# Patient Record
Sex: FEMALE | Race: BLACK OR AFRICAN AMERICAN | Employment: OTHER | URBAN - METROPOLITAN AREA
[De-identification: names, ages, dates, MRNs, and addresses within clinical notes are randomized per-mention and may not be internally consistent; named-entity substitution may affect disease eponyms.]

---

## 2023-01-20 PROBLEM — R73.03 PREDIABETES: Status: ACTIVE | Noted: 2023-01-20

## 2023-01-20 PROBLEM — R01.1 HEART MURMUR: Status: ACTIVE | Noted: 2023-01-20

## 2023-01-20 PROBLEM — N95.2 ATROPHIC VAGINITIS: Status: ACTIVE | Noted: 2023-01-20

## 2023-01-20 PROBLEM — E78.5 HLD (HYPERLIPIDEMIA): Status: ACTIVE | Noted: 2023-01-20

## 2023-01-20 PROBLEM — R22.31 MASS OF RIGHT UPPER EXTREMITY: Status: ACTIVE | Noted: 2023-01-20

## 2023-01-20 PROBLEM — E04.1 THYROID NODULE, COLD: Status: ACTIVE | Noted: 2023-01-20

## 2023-01-20 PROBLEM — E04.2 MULTINODULAR GOITER: Status: ACTIVE | Noted: 2023-01-20

## 2023-01-20 PROBLEM — R31.9 HEMATURIA: Status: ACTIVE | Noted: 2023-01-20

## 2023-01-20 PROBLEM — D17.21 LIPOMA OF RIGHT UPPER EXTREMITY: Status: ACTIVE | Noted: 2023-01-20

## 2023-01-20 PROBLEM — M79.89 SWELLING OF FINGER, LEFT: Status: ACTIVE | Noted: 2023-01-20

## 2023-01-20 PROBLEM — E04.9 THYROID ENLARGEMENT: Status: ACTIVE | Noted: 2023-01-20

## 2023-01-20 PROBLEM — L72.0 EPIDERMAL CYST: Status: ACTIVE | Noted: 2023-01-20

## 2023-01-20 PROBLEM — R23.3 SPONTANEOUS BRUISING: Status: ACTIVE | Noted: 2023-01-20

## 2023-01-20 PROBLEM — E55.9 VITAMIN D DEFICIENCY: Status: ACTIVE | Noted: 2023-01-20

## 2023-01-20 RX ORDER — PRASTERONE 6.5 MG/1
INSERT VAGINAL NIGHTLY
COMMUNITY
Start: 2019-11-22

## 2023-01-20 RX ORDER — ROSUVASTATIN CALCIUM 5 MG/1
5 TABLET, COATED ORAL NIGHTLY
COMMUNITY
End: 2023-03-07 | Stop reason: SDUPTHER

## 2023-01-20 RX ORDER — CHOLECALCIFEROL (VITAMIN D3) 50 MCG
TABLET ORAL
COMMUNITY

## 2023-02-21 LAB
ALANINE AMINOTRANSFERASE (SGPT) (U/L) IN SER/PLAS: 17 U/L (ref 7–45)
ALBUMIN (G/DL) IN SER/PLAS: 3.8 G/DL (ref 3.4–5)
ALKALINE PHOSPHATASE (U/L) IN SER/PLAS: 79 U/L (ref 33–136)
ANION GAP IN SER/PLAS: 11 MMOL/L (ref 10–20)
ASPARTATE AMINOTRANSFERASE (SGOT) (U/L) IN SER/PLAS: 20 U/L (ref 9–39)
BILIRUBIN TOTAL (MG/DL) IN SER/PLAS: 0.5 MG/DL (ref 0–1.2)
C REACTIVE PROTEIN (MG/L) IN SER/PLAS: 0.56 MG/DL
CALCIDIOL (25 OH VITAMIN D3) (NG/ML) IN SER/PLAS: 40 NG/ML
CALCIUM (MG/DL) IN SER/PLAS: 9.1 MG/DL (ref 8.6–10.3)
CARBON DIOXIDE, TOTAL (MMOL/L) IN SER/PLAS: 30 MMOL/L (ref 21–32)
CHLORIDE (MMOL/L) IN SER/PLAS: 102 MMOL/L (ref 98–107)
CHOLESTEROL (MG/DL) IN SER/PLAS: 175 MG/DL (ref 0–199)
CHOLESTEROL IN HDL (MG/DL) IN SER/PLAS: 74.9 MG/DL
CHOLESTEROL/HDL RATIO: 2.3
CITRULLINE ANTIBODY, IGG: <1 U/ML
COBALAMIN (VITAMIN B12) (PG/ML) IN SER/PLAS: 352 PG/ML (ref 211–911)
CREATININE (MG/DL) IN SER/PLAS: 0.77 MG/DL (ref 0.5–1.05)
ERYTHROCYTE DISTRIBUTION WIDTH (RATIO) BY AUTOMATED COUNT: 13.1 % (ref 11.5–14.5)
ERYTHROCYTE MEAN CORPUSCULAR HEMOGLOBIN CONCENTRATION (G/DL) BY AUTOMATED: 32.8 G/DL (ref 32–36)
ERYTHROCYTE MEAN CORPUSCULAR VOLUME (FL) BY AUTOMATED COUNT: 84 FL (ref 80–100)
ERYTHROCYTES (10*6/UL) IN BLOOD BY AUTOMATED COUNT: 5.04 X10E12/L (ref 4–5.2)
ESTIMATED AVERAGE GLUCOSE FOR HBA1C: 111 MG/DL
GFR FEMALE: 85 ML/MIN/1.73M2
GLUCOSE (MG/DL) IN SER/PLAS: 80 MG/DL (ref 74–99)
HEMATOCRIT (%) IN BLOOD BY AUTOMATED COUNT: 42.4 % (ref 36–46)
HEMOGLOBIN (G/DL) IN BLOOD: 13.9 G/DL (ref 12–16)
HEMOGLOBIN A1C/HEMOGLOBIN TOTAL IN BLOOD: 5.5 %
LDL: 89 MG/DL (ref 0–99)
LEUKOCYTES (10*3/UL) IN BLOOD BY AUTOMATED COUNT: 3.2 X10E9/L (ref 4.4–11.3)
PLATELETS (10*3/UL) IN BLOOD AUTOMATED COUNT: 236 X10E9/L (ref 150–450)
POTASSIUM (MMOL/L) IN SER/PLAS: 4.3 MMOL/L (ref 3.5–5.3)
PROTEIN TOTAL: 7 G/DL (ref 6.4–8.2)
PROTEIN TOTAL: 7 G/DL (ref 6.4–8.2)
RHEUMATOID FACTOR (IU/ML) IN SERUM OR PLASMA: <10 IU/ML (ref 0–15)
SEDIMENTATION RATE, ERYTHROCYTE: 49 MM/H (ref 0–30)
SODIUM (MMOL/L) IN SER/PLAS: 139 MMOL/L (ref 136–145)
THYROTROPIN (MIU/L) IN SER/PLAS BY DETECTION LIMIT <= 0.05 MIU/L: 0.86 MIU/L (ref 0.44–3.98)
TRIGLYCERIDE (MG/DL) IN SER/PLAS: 55 MG/DL (ref 0–149)
UREA NITROGEN (MG/DL) IN SER/PLAS: 11 MG/DL (ref 6–23)
VLDL: 11 MG/DL (ref 0–40)

## 2023-02-23 LAB
ANA PATTERN: ABNORMAL
ANA TITER: ABNORMAL
ANTI-CENTROMERE: <0.2 AI
ANTI-CHROMATIN: 0.5 AI
ANTI-DNA (DS): <1 IU/ML
ANTI-JO-1 IGG: <0.2 AI
ANTI-NUCLEAR ANTIBODY (ANA): POSITIVE
ANTI-RIBOSOMAL P: <0.2 AI
ANTI-RNP: 0.3 AI
ANTI-SCL-70: <0.2 AI
ANTI-SM/RNP: <0.2 AI
ANTI-SM: <0.2 AI
ANTI-SSA: <0.2 AI
ANTI-SSB: <0.2 AI

## 2023-02-25 LAB
ALBUMIN ELP: 4 G/DL (ref 3.4–5)
ALPHA 1: 0.3 G/DL (ref 0.2–0.6)
ALPHA 2: 0.8 G/DL (ref 0.4–1.1)
BETA: 0.8 G/DL (ref 0.5–1.2)
GAMMA GLOBULIN: 1.1 G/DL (ref 0.5–1.4)
M-PROTEIN 1: 0.2 G/DL
PATH REVIEW - SERUM IMMUNOFIXATION: NORMAL
PATH REVIEW-SERUM PROTEIN ELECTROPHORESIS: NORMAL
PROTEIN ELECTROPHORESIS INTERPRETATION: ABNORMAL
PROTEIN TOTAL: 7 G/DL (ref 6.4–8.2)
SERUM IMMUNOFIXATION INTERPRETATION: ABNORMAL

## 2023-03-07 ENCOUNTER — OFFICE VISIT (OUTPATIENT)
Dept: PRIMARY CARE | Facility: CLINIC | Age: 68
End: 2023-03-07
Payer: COMMERCIAL

## 2023-03-07 VITALS
BODY MASS INDEX: 26.66 KG/M2 | DIASTOLIC BLOOD PRESSURE: 73 MMHG | OXYGEN SATURATION: 94 % | SYSTOLIC BLOOD PRESSURE: 122 MMHG | HEIGHT: 65 IN | WEIGHT: 160 LBS | HEART RATE: 87 BPM

## 2023-03-07 DIAGNOSIS — N95.8 OTHER SPECIFIED MENOPAUSAL AND PERIMENOPAUSAL DISORDERS: ICD-10-CM

## 2023-03-07 DIAGNOSIS — E55.9 VITAMIN D DEFICIENCY: ICD-10-CM

## 2023-03-07 DIAGNOSIS — Z23 NEED FOR PNEUMOCOCCAL VACCINE: ICD-10-CM

## 2023-03-07 DIAGNOSIS — Z00.00 WELCOME TO MEDICARE PREVENTIVE VISIT: Primary | ICD-10-CM

## 2023-03-07 DIAGNOSIS — E78.00 PURE HYPERCHOLESTEROLEMIA: ICD-10-CM

## 2023-03-07 DIAGNOSIS — R73.03 PREDIABETES: ICD-10-CM

## 2023-03-07 DIAGNOSIS — M79.89 SWELLING OF FINGER, LEFT: ICD-10-CM

## 2023-03-07 DIAGNOSIS — Z12.11 ENCOUNTER FOR SCREENING FOR MALIGNANT NEOPLASM OF COLON: ICD-10-CM

## 2023-03-07 DIAGNOSIS — N95.2 ATROPHIC VAGINITIS: ICD-10-CM

## 2023-03-07 PROCEDURE — G0402 INITIAL PREVENTIVE EXAM: HCPCS | Performed by: FAMILY MEDICINE

## 2023-03-07 PROCEDURE — 1157F ADVNC CARE PLAN IN RCRD: CPT | Performed by: FAMILY MEDICINE

## 2023-03-07 PROCEDURE — 1036F TOBACCO NON-USER: CPT | Performed by: FAMILY MEDICINE

## 2023-03-07 PROCEDURE — 93010 ELECTROCARDIOGRAM REPORT: CPT | Performed by: FAMILY MEDICINE

## 2023-03-07 PROCEDURE — 99397 PER PM REEVAL EST PAT 65+ YR: CPT | Performed by: FAMILY MEDICINE

## 2023-03-07 PROCEDURE — 90677 PCV20 VACCINE IM: CPT | Performed by: FAMILY MEDICINE

## 2023-03-07 PROCEDURE — 1159F MED LIST DOCD IN RCRD: CPT | Performed by: FAMILY MEDICINE

## 2023-03-07 PROCEDURE — 1160F RVW MEDS BY RX/DR IN RCRD: CPT | Performed by: FAMILY MEDICINE

## 2023-03-07 PROCEDURE — 99213 OFFICE O/P EST LOW 20 MIN: CPT | Performed by: FAMILY MEDICINE

## 2023-03-07 PROCEDURE — 1170F FXNL STATUS ASSESSED: CPT | Performed by: FAMILY MEDICINE

## 2023-03-07 RX ORDER — ROSUVASTATIN CALCIUM 5 MG/1
5 TABLET, COATED ORAL NIGHTLY
Qty: 90 TABLET | Refills: 3 | Status: SHIPPED | OUTPATIENT
Start: 2023-03-07 | End: 2024-03-05 | Stop reason: SDUPTHER

## 2023-03-07 ASSESSMENT — ENCOUNTER SYMPTOMS
LOSS OF SENSATION IN FEET: 0
OCCASIONAL FEELINGS OF UNSTEADINESS: 0
DEPRESSION: 0

## 2023-03-07 ASSESSMENT — PATIENT HEALTH QUESTIONNAIRE - PHQ9
SUM OF ALL RESPONSES TO PHQ9 QUESTIONS 1 AND 2: 0
2. FEELING DOWN, DEPRESSED OR HOPELESS: NOT AT ALL
2. FEELING DOWN, DEPRESSED OR HOPELESS: NOT AT ALL
1. LITTLE INTEREST OR PLEASURE IN DOING THINGS: NOT AT ALL
1. LITTLE INTEREST OR PLEASURE IN DOING THINGS: NOT AT ALL
SUM OF ALL RESPONSES TO PHQ9 QUESTIONS 1 AND 2: 0

## 2023-03-07 ASSESSMENT — ACTIVITIES OF DAILY LIVING (ADL)
DRESSING YOURSELF: INDEPENDENT
GROOMING: INDEPENDENT
WALKS IN HOME: INDEPENDENT
HEARING - LEFT EAR: FUNCTIONAL
JUDGMENT_ADEQUATE_SAFELY_COMPLETE_DAILY_ACTIVITIES: YES
HEARING - RIGHT EAR: FUNCTIONAL
FEEDING YOURSELF: INDEPENDENT
TOILETING: INDEPENDENT
BATHING: INDEPENDENT
PATIENT'S MEMORY ADEQUATE TO SAFELY COMPLETE DAILY ACTIVITIES?: YES
ADEQUATE_TO_COMPLETE_ADL: YES

## 2023-03-07 ASSESSMENT — COLUMBIA-SUICIDE SEVERITY RATING SCALE - C-SSRS
6. HAVE YOU EVER DONE ANYTHING, STARTED TO DO ANYTHING, OR PREPARED TO DO ANYTHING TO END YOUR LIFE?: NO
2. HAVE YOU ACTUALLY HAD ANY THOUGHTS OF KILLING YOURSELF?: NO
1. IN THE PAST MONTH, HAVE YOU WISHED YOU WERE DEAD OR WISHED YOU COULD GO TO SLEEP AND NOT WAKE UP?: NO

## 2023-03-07 NOTE — PROGRESS NOTES
Subjective   Patient ID: Gwen Thompson is a 67 y.o. female who presents for a Welcome to Medicare visit.    Recent Hospitalizations: No  Have you felt Anxious depressed or sad in the last 4 weeks: No   Do you have any pain right now: Mild  Do you have someone to help you when you need help: Quite a bit  Can you get to places out of walking distance without help: Yes  Can you do your own grocery shopping without help: Yes  Can you prepare meals without help: Yes  Can you do your housework without help: Yes  Can you manage your money without help: Yes    How do you rate your overall health: Good    Any trouble driving:     How much Alcohol do you drink on an average in a week: 2 drinks weekly     Exercise: Yoga and cardio 3> times per week  Diet: Well- balanced  Fluid intake: Yes  Caffeine: Yes    Hearing concerns: No  Cognitive impairment: No   Incontinence: No    Home safety Concerns: No       HPI  The patient's cholesterol is well- controlled on Rosuvastatin at this time, she reports not side effects from this medication. She states that she is taking Intrarosa for her vaginal atrophy as prescribed and is tolerating it well. She complains of swelling in her finger and would like an occupational therapy referral. She mentions that she has a lipoma and noticed that it has been slowly getting bigger.    Review of Systems  Constitutional: No fever or chills, No Night Sweats  Eyes: No Blurry Vision or Eye sight problems  ENT: No Nasal Discharge, Hoarseness, sore throat  Cardiovascular: no chest pain, no palpitations and no syncope.   Respiratory: no cough, no shortness of breath during exertion and no shortness of breath at rest.   Gastrointestinal: no abdominal pain, no nausea and no vomiting.   : No vaginal discharge, burning with urination, no blood in urine or stools  Skin: No Skin rashes or Lesions  Neuro: No Headache, no dizziness or Numbness or tingling  Psych: No Anxiety, depression or sleeping  "problems  Heme: No Easy bleeding or brusing.   MSK: + finger swelling.    Objective   /73 (BP Location: Left arm, Patient Position: Sitting, BP Cuff Size: Adult)   Pulse 87   Ht 1.638 m (5' 4.5\")   Wt 72.6 kg (160 lb)   SpO2 94%   BMI 27.04 kg/m²     Physical Exam  Patient declined Chaperone  Constitutional: Alert and in no acute distress. Well developed, well nourished.   Head and Face: Head and face: Normal.    Eyes: Normal external exam. Pupils were equal in size, round, reactive to light (PERRL) with normal accommodation and extraocular movements intact (EOMI).   Ears, Nose, Mouth, and Throat: External inspection of ears and nose: Normal.  Hearing: Normal.  Nasal mucosa, septum, and turbinates: Normal.  Lips, teeth, and gums: Normal.  Oropharynx: Normal.   Neck: No neck mass was observed. Supple. Thyroid not enlarged and there were no palpable thyroid nodules.   Cardiovascular: Heart rate and rhythm were normal, normal S1 and S2. Pedal pulses: Normal. No peripheral edema.   Pulmonary: No respiratory distress. Clear bilateral breath sounds.   Breast: Normal Appearance, No Masses or lumps palpated  Abdomen: Soft nontender; no abdominal mass palpated. Normal bowel sounds. No organomegaly.   Musculoskeletal: No joint swelling seen, normal movements of all extremities. Range of motion: Normal.  Muscle strength/tone: Normal.    Skin: Normal skin color and pigmentation, normal skin turgor, and no rash.   Neurologic: Deep tendon reflexes were 2+ and symmetric.   Psychiatric: Judgment and insight: Intact. Mood and affect: Normal.  Lymphatic: No cervical lymphadenopathy. Palpation of lymph nodes in axillae: Normal.  Palpation of lymph nodes in groin: Normal.    Assessment/Plan   Diagnoses and all orders for this visit:  Welcome to Medicare preventive visit  -     ECG 12 lead  -     Follow Up In [facility] - PCP; Future  Atrophic vaginitis  Swelling of finger, left  -     Referral to Occupational " Therapy; Future  Prediabetes  -     CBC; Future  -     Hemoglobin A1C; Future  -     TSH with reflex to Free T4 if abnormal; Future  -     Vitamin B12; Future  Pure hypercholesterolemia  -     rosuvastatin (Crestor) 5 mg tablet; Take 1 tablet (5 mg) by mouth once daily at bedtime.  -     Comprehensive Metabolic Panel; Future  -     Lipid Panel; Future  Other specified menopausal and perimenopausal disorders  -     XR DEXA bone density; Future  Encounter for screening for malignant neoplasm of colon  -     Colonoscopy; Future  Need for pneumococcal vaccine  -     Pneumococcal conjugate vaccine, 20-valent, adult (PREVNAR 20)  Vitamin D deficiency  -     Vitamin D, Total; Future        Dear Gwen Thompson     It was my pleasure to take care of you today in the office. Below are the things we discussed today:    1. 1. Immunizations: Yearly Flu shot is recommended. Up to date         a: COVID: Up-to-Date         b: Tetanus: Up-to-date         c: Shingrix: Up-to-date         d: Prevnar: Given today    2. Blood Work: Reviewed   3. Seen your dentist twice a year  4. Yearly Eye exam is recommended    5. BMI: Overweight  6: Diet recommendations:   Eat Clean, Try to have as many home cooked meals as possible  Avoid processed foods which contain excess calories, sugar, and sodium.    7. Exercise recommendations:   150 minutes a week to maintain your weight     If you have to loose weight, you need a better diet and exercise plan.     8. Supplements recommended:  a - Calcium 600 mg up to twice a day to get a total of 1200 mg. Each 8 oz of milk or yogurt or 1 oz of cheese, 1 Banana, 1 serving of green Leafy vegetable has about 300 mg of Calcium, so you may subtract that amount. Calcium citrate is the only acceptable supplement to take if you take an acid suppressing medication like Prilosec; otherwise Calcium carbonate is acceptable too (It can cause Constipation).   b - Vitamin D - 2000 IU daily     9. Please get your Living  will / Advance directive completed if you do not have one already. Please make sure our office has a copy of the latest one.     10. Colonoscopy: Ordered  11. Mammogram: Uptodate   12. PAP: Follows up with GYN at [facility]  13. DEXA: Ordered  14: Skin Check: Please see Dermatology once a year for a Skin Check.     15. Hyperlipidemia: Continue Rosuvastatin.    16. Prediabetes: Resolved.    17. Finger swelling: Occupational therapy referral. If there is no improvement the patient will follow up with Rheumatology.    18. Vaginal atrophy: Continue Intrarosa.    19. Lipoma: Advised patient that if it starts to affect her daily life please let me know.    Follow up in one year for a Physical. Please call the office before your Physical to see if you need blood work completed prior to your physical.     Please call me if any questions arise from now until your next visit. I will call you after I am done seeing patients. A Doctor is always available by phone when the office is closed. Please feel free to call for help with any problem that you feel shouldn't wait until the office re-opens.     Scribe Attestation  By signing my name below, IMarily Scribe   attest that this documentation has been prepared under the direction and in the presence of Shayna Park MD.

## 2023-04-25 ENCOUNTER — OFFICE VISIT (OUTPATIENT)
Dept: PRIMARY CARE | Facility: CLINIC | Age: 68
End: 2023-04-25
Payer: MEDICARE

## 2023-04-25 VITALS
WEIGHT: 161 LBS | HEART RATE: 92 BPM | OXYGEN SATURATION: 95 % | HEIGHT: 65 IN | SYSTOLIC BLOOD PRESSURE: 98 MMHG | DIASTOLIC BLOOD PRESSURE: 62 MMHG | BODY MASS INDEX: 26.82 KG/M2

## 2023-04-25 DIAGNOSIS — L84 CALLUS: ICD-10-CM

## 2023-04-25 DIAGNOSIS — R73.03 PREDIABETES: Primary | ICD-10-CM

## 2023-04-25 DIAGNOSIS — E04.1 THYROID NODULE, COLD: ICD-10-CM

## 2023-04-25 PROCEDURE — 1157F ADVNC CARE PLAN IN RCRD: CPT | Performed by: FAMILY MEDICINE

## 2023-04-25 PROCEDURE — 99214 OFFICE O/P EST MOD 30 MIN: CPT | Performed by: FAMILY MEDICINE

## 2023-04-25 PROCEDURE — 1159F MED LIST DOCD IN RCRD: CPT | Performed by: FAMILY MEDICINE

## 2023-04-25 PROCEDURE — 1036F TOBACCO NON-USER: CPT | Performed by: FAMILY MEDICINE

## 2023-04-25 PROCEDURE — 1160F RVW MEDS BY RX/DR IN RCRD: CPT | Performed by: FAMILY MEDICINE

## 2023-04-25 ASSESSMENT — ENCOUNTER SYMPTOMS
OCCASIONAL FEELINGS OF UNSTEADINESS: 0
DEPRESSION: 0
LOSS OF SENSATION IN FEET: 0

## 2023-04-25 ASSESSMENT — PATIENT HEALTH QUESTIONNAIRE - PHQ9
2. FEELING DOWN, DEPRESSED OR HOPELESS: NOT AT ALL
1. LITTLE INTEREST OR PLEASURE IN DOING THINGS: NOT AT ALL
SUM OF ALL RESPONSES TO PHQ9 QUESTIONS 1 AND 2: 0

## 2023-04-25 NOTE — PROGRESS NOTES
"Subjective   Patient ID: Gwen Thomposn is a 67 y.o. female who presents for Foot Problem.    HPI   The patient states that she has bilateral foot calloses and would like to follow up with Podiatry to get them removed because they have been causing her severe pain. She is also concerned about her thyroid nodules and would like to get follow up testing done.     Review of Systems  Constitutional: No fever or chills  Cardiovascular: no chest pain, no palpitations and no syncope.   Respiratory: no cough, no shortness of breath during exertion and no shortness of breath at rest.   Gastrointestinal: no abdominal pain, no nausea and no vomiting.  Neuro: No Headache, no dizziness  Skin: + calluses on feet.    Objective   BP 98/62   Pulse 92   Ht 1.638 m (5' 4.5\")   Wt 73 kg (161 lb)   SpO2 95%   BMI 27.21 kg/m²     Physical Exam  Constitutional: Alert and in no acute distress. Well developed, well nourished  Head and Face: Head and face: Normal.    Cardiovascular: Heart rate and rhythm were normal, normal S1 and S2. No peripheral edema.   Pulmonary: No respiratory distress. Clear bilateral breath sounds.  Musculoskeletal: Gait and station: Normal. Muscle strength/tone: Normal.   Skin: Normal skin color and pigmentation, normal skin turgor, and no rash.    Psychiatric: Judgment and insight: Intact. Mood and affect: Normal.    Lab Results   Component Value Date    WBC 3.2 (L) 02/21/2023    HGB 13.9 02/21/2023    HCT 42.4 02/21/2023     02/21/2023    CHOL 175 02/21/2023    TRIG 55 02/21/2023    HDL 74.9 02/21/2023    LDLDIRECT 171 (H) 02/12/2020    ALT 17 02/21/2023    AST 20 02/21/2023     02/21/2023    K 4.3 02/21/2023     02/21/2023    CREATININE 0.77 02/21/2023    BUN 11 02/21/2023    CO2 30 02/21/2023    TSH 0.86 02/21/2023    INR 1.1 10/30/2019    HGBA1C 5.5 02/21/2023       ECG 12 lead  EKG showed normal sinus rhythm      Assessment/Plan   Diagnoses and all orders for this " visit:  Prediabetes  -     Urinalysis with Reflex Microscopic; Future  Thyroid nodule, cold  -     US thyroid; Future  Callus  -     Referral to Podiatry; Future        Dear Gwen Thompson     It was my pleasure to take care of you today in the office. Below are the things we discussed today:    1. Thyroid nodule: Stable. Ultrasound ordered for November.     2. Calluses: Podiatry referral.     3. Tetanus: Please get from the pharmacy or come back for it.    4. Concern for Bladder cancer - Ordered UA to see if blood is noted,     Your yearly Physical is due in: March 2024  When you call the office for your yearly Physical, please ask them to inform me to order your blood work, so that you can get the fasting blood work before your appointment and we can discuss the results at your physical.      Please call me if any questions arise from now until your next visit. I will call you after I am done seeing patients. A Doctor is always available by phone when the office is closed. Please feel free to call for help with any problem that you feel shouldn't wait until the office re-opens.     Scribe Attestation  By signing my name below, IMarily Scribe   attest that this documentation has been prepared under the direction and in the presence of Shayna Park MD.

## 2023-05-12 ENCOUNTER — TELEPHONE (OUTPATIENT)
Dept: PRIMARY CARE | Facility: CLINIC | Age: 68
End: 2023-05-12

## 2023-05-18 ENCOUNTER — LAB (OUTPATIENT)
Dept: LAB | Facility: LAB | Age: 68
End: 2023-05-18
Payer: MEDICARE

## 2023-05-18 DIAGNOSIS — R73.03 PREDIABETES: ICD-10-CM

## 2023-05-18 LAB
APPEARANCE, URINE: CLEAR
BACTERIA, URINE: ABNORMAL /HPF
BILIRUBIN, URINE: NEGATIVE
BLOOD, URINE: ABNORMAL
COLOR, URINE: YELLOW
GLUCOSE, URINE: NEGATIVE MG/DL
KETONES, URINE: NEGATIVE MG/DL
LEUKOCYTE ESTERASE, URINE: ABNORMAL
NITRITE, URINE: NEGATIVE
PH, URINE: 6 (ref 5–8)
PROTEIN, URINE: NEGATIVE MG/DL
RBC, URINE: 1 /HPF (ref 0–5)
SPECIFIC GRAVITY, URINE: 1.01 (ref 1–1.03)
SQUAMOUS EPITHELIAL CELLS, URINE: 2 /HPF
UROBILINOGEN, URINE: <2 MG/DL (ref 0–1.9)
WBC, URINE: 3 /HPF (ref 0–5)

## 2023-05-18 PROCEDURE — 81001 URINALYSIS AUTO W/SCOPE: CPT

## 2023-05-22 ENCOUNTER — OFFICE VISIT (OUTPATIENT)
Dept: PRIMARY CARE | Facility: CLINIC | Age: 68
End: 2023-05-22
Payer: MEDICARE

## 2023-05-22 ENCOUNTER — LAB (OUTPATIENT)
Dept: LAB | Facility: LAB | Age: 68
End: 2023-05-22
Payer: MEDICARE

## 2023-05-22 VITALS
BODY MASS INDEX: 28.17 KG/M2 | DIASTOLIC BLOOD PRESSURE: 79 MMHG | SYSTOLIC BLOOD PRESSURE: 129 MMHG | HEART RATE: 78 BPM | WEIGHT: 159 LBS | HEIGHT: 63 IN | OXYGEN SATURATION: 96 %

## 2023-05-22 DIAGNOSIS — N30.00 ACUTE CYSTITIS WITHOUT HEMATURIA: ICD-10-CM

## 2023-05-22 DIAGNOSIS — Z23 ENCOUNTER FOR IMMUNIZATION: ICD-10-CM

## 2023-05-22 DIAGNOSIS — N30.00 ACUTE CYSTITIS WITHOUT HEMATURIA: Primary | ICD-10-CM

## 2023-05-22 LAB
APPEARANCE, URINE: CLEAR
BILIRUBIN, URINE: NEGATIVE
BLOOD, URINE: ABNORMAL
COLOR, URINE: YELLOW
GLUCOSE, URINE: NEGATIVE MG/DL
KETONES, URINE: NEGATIVE MG/DL
LEUKOCYTE ESTERASE, URINE: ABNORMAL
MUCUS, URINE: NORMAL /LPF
NITRITE, URINE: NEGATIVE
PH, URINE: 5 (ref 5–8)
PROTEIN, URINE: NEGATIVE MG/DL
RBC, URINE: 1 /HPF (ref 0–5)
SPECIFIC GRAVITY, URINE: 1.01 (ref 1–1.03)
SQUAMOUS EPITHELIAL CELLS, URINE: <1 /HPF
UROBILINOGEN, URINE: <2 MG/DL (ref 0–1.9)
WBC, URINE: 4 /HPF (ref 0–5)

## 2023-05-22 PROCEDURE — 1157F ADVNC CARE PLAN IN RCRD: CPT | Performed by: FAMILY MEDICINE

## 2023-05-22 PROCEDURE — 1159F MED LIST DOCD IN RCRD: CPT | Performed by: FAMILY MEDICINE

## 2023-05-22 PROCEDURE — 1160F RVW MEDS BY RX/DR IN RCRD: CPT | Performed by: FAMILY MEDICINE

## 2023-05-22 PROCEDURE — 99213 OFFICE O/P EST LOW 20 MIN: CPT | Performed by: FAMILY MEDICINE

## 2023-05-22 PROCEDURE — 87086 URINE CULTURE/COLONY COUNT: CPT

## 2023-05-22 PROCEDURE — 90471 IMMUNIZATION ADMIN: CPT | Performed by: FAMILY MEDICINE

## 2023-05-22 PROCEDURE — 1036F TOBACCO NON-USER: CPT | Performed by: FAMILY MEDICINE

## 2023-05-22 PROCEDURE — 90715 TDAP VACCINE 7 YRS/> IM: CPT | Performed by: FAMILY MEDICINE

## 2023-05-22 PROCEDURE — 81001 URINALYSIS AUTO W/SCOPE: CPT

## 2023-05-22 RX ORDER — CEPHALEXIN 500 MG/1
500 CAPSULE ORAL 2 TIMES DAILY
Qty: 14 CAPSULE | Refills: 0 | Status: SHIPPED | OUTPATIENT
Start: 2023-05-22 | End: 2023-05-29

## 2023-05-22 ASSESSMENT — ACTIVITIES OF DAILY LIVING (ADL)
GROCERY_SHOPPING: INDEPENDENT
TAKING_MEDICATION: INDEPENDENT
DOING_HOUSEWORK: INDEPENDENT
MANAGING_FINANCES: INDEPENDENT
DRESSING: INDEPENDENT
BATHING: INDEPENDENT

## 2023-05-22 ASSESSMENT — VISUAL ACUITY
OD_CC: 20/20
OS_CC: 20/20

## 2023-05-22 ASSESSMENT — PATIENT HEALTH QUESTIONNAIRE - PHQ9
SUM OF ALL RESPONSES TO PHQ9 QUESTIONS 1 AND 2: 0
1. LITTLE INTEREST OR PLEASURE IN DOING THINGS: NOT AT ALL
2. FEELING DOWN, DEPRESSED OR HOPELESS: NOT AT ALL

## 2023-05-22 NOTE — PROGRESS NOTES
"Subjective   Patient ID: Gwen Thompson is a 67 y.o. female who presents for UTI.    HPI   The patient reports that she is still experiencing some vaginal irritation and blood work showed hematuria.    Review of Systems  Constitutional: No fever or chills  Cardiovascular: no chest pain, no palpitations and no syncope.   Respiratory: no cough, no shortness of breath during exertion and no shortness of breath at rest.   Gastrointestinal: no abdominal pain, no nausea and no vomiting.  : + vaginal irritation.  Neuro: No Headache, no dizziness    Objective   /79   Pulse 78   Ht 1.6 m (5' 3\")   Wt 72.1 kg (159 lb)   SpO2 96%   BMI 28.17 kg/m²     Physical Exam  Constitutional: Alert and in no acute distress. Well developed, well nourished  Head and Face: Head and face: Normal.    Cardiovascular: Heart rate and rhythm were normal, normal S1 and S2. No peripheral edema.   Pulmonary: No respiratory distress. Clear bilateral breath sounds.  Musculoskeletal: Gait and station: Normal. Muscle strength/tone: Normal.   Skin: Normal skin color and pigmentation, normal skin turgor, and no rash.    Psychiatric: Judgment and insight: Intact. Mood and affect: Normal.    Lab Results   Component Value Date    WBC 3.2 (L) 02/21/2023    HGB 13.9 02/21/2023    HCT 42.4 02/21/2023     02/21/2023    CHOL 175 02/21/2023    TRIG 55 02/21/2023    HDL 74.9 02/21/2023    LDLDIRECT 171 (H) 02/12/2020    ALT 17 02/21/2023    AST 20 02/21/2023     02/21/2023    K 4.3 02/21/2023     02/21/2023    CREATININE 0.77 02/21/2023    BUN 11 02/21/2023    CO2 30 02/21/2023    TSH 0.86 02/21/2023    INR 1.1 10/30/2019    HGBA1C 5.5 02/21/2023       ECG 12 lead  EKG showed normal sinus rhythm      Assessment/Plan   Diagnoses and all orders for this visit:  Acute cystitis without hematuria  -     Urinalysis with Reflex Microscopic; Future  -     Urine Culture; Future  -     cephalexin (Keflex) 500 mg capsule; Take 1 capsule (500 " mg) by mouth 2 times a day for 7 days.  Encounter for immunization  -     Tdap vaccine, age 10 years and older  (BOOSTRIX)        Dear Gwen Thompson     It was my pleasure to take care of you today in the office. Below are the things we discussed today:    1. Hematuria: Repeat urinalysis. Start Keflex.    2. Tetanus: Given today. Patient Signed ABN and understands medicare might now cover it.     Your yearly Physical is due in: March 2024  When you call the office for your yearly Physical, please ask them to inform me to order your blood work, so that you can get the fasting blood work before your appointment and we can discuss the results at your physical.      Please call me if any questions arise from now until your next visit. I will call you after I am done seeing patients. A Doctor is always available by phone when the office is closed. Please feel free to call for help with any problem that you feel shouldn't wait until the office re-opens.     Scribe Attestation  By signing my name below, IMarily Scribe   attest that this documentation has been prepared under the direction and in the presence of Shayna Park MD.

## 2023-05-25 LAB — URINE CULTURE: NORMAL

## 2023-11-09 ENCOUNTER — HOSPITAL ENCOUNTER (OUTPATIENT)
Dept: RADIOLOGY | Facility: HOSPITAL | Age: 68
Discharge: HOME | End: 2023-11-09
Payer: MEDICARE

## 2023-11-09 DIAGNOSIS — E04.1 NONTOXIC SINGLE THYROID NODULE: ICD-10-CM

## 2023-11-09 PROCEDURE — 76536 US EXAM OF HEAD AND NECK: CPT

## 2023-11-09 PROCEDURE — 76536 US EXAM OF HEAD AND NECK: CPT | Performed by: RADIOLOGY

## 2024-02-06 ENCOUNTER — LAB (OUTPATIENT)
Dept: LAB | Facility: LAB | Age: 69
End: 2024-02-06
Payer: MEDICARE

## 2024-02-06 DIAGNOSIS — R73.03 PREDIABETES: ICD-10-CM

## 2024-02-06 DIAGNOSIS — E55.9 VITAMIN D DEFICIENCY: ICD-10-CM

## 2024-02-06 DIAGNOSIS — E78.00 PURE HYPERCHOLESTEROLEMIA: ICD-10-CM

## 2024-02-06 LAB
25(OH)D3 SERPL-MCNC: 48 NG/ML (ref 30–100)
ALBUMIN SERPL BCP-MCNC: 4.3 G/DL (ref 3.4–5)
ALP SERPL-CCNC: 74 U/L (ref 33–136)
ALT SERPL W P-5'-P-CCNC: 15 U/L (ref 7–45)
ANION GAP SERPL CALC-SCNC: 14 MMOL/L (ref 10–20)
AST SERPL W P-5'-P-CCNC: 19 U/L (ref 9–39)
BILIRUB SERPL-MCNC: 0.5 MG/DL (ref 0–1.2)
BUN SERPL-MCNC: 12 MG/DL (ref 6–23)
CALCIUM SERPL-MCNC: 9.4 MG/DL (ref 8.6–10.3)
CHLORIDE SERPL-SCNC: 101 MMOL/L (ref 98–107)
CHOLEST SERPL-MCNC: 165 MG/DL (ref 0–199)
CHOLESTEROL/HDL RATIO: 2.2
CO2 SERPL-SCNC: 27 MMOL/L (ref 21–32)
CREAT SERPL-MCNC: 0.81 MG/DL (ref 0.5–1.05)
EGFRCR SERPLBLD CKD-EPI 2021: 80 ML/MIN/1.73M*2
ERYTHROCYTE [DISTWIDTH] IN BLOOD BY AUTOMATED COUNT: 12.9 % (ref 11.5–14.5)
EST. AVERAGE GLUCOSE BLD GHB EST-MCNC: 123 MG/DL
GLUCOSE SERPL-MCNC: 95 MG/DL (ref 74–99)
HBA1C MFR BLD: 5.9 %
HCT VFR BLD AUTO: 41.3 % (ref 36–46)
HDLC SERPL-MCNC: 76.4 MG/DL
HGB BLD-MCNC: 13.8 G/DL (ref 12–16)
LDLC SERPL CALC-MCNC: 80 MG/DL
MCH RBC QN AUTO: 28.2 PG (ref 26–34)
MCHC RBC AUTO-ENTMCNC: 33.4 G/DL (ref 32–36)
MCV RBC AUTO: 85 FL (ref 80–100)
NON HDL CHOLESTEROL: 89 MG/DL (ref 0–149)
NRBC BLD-RTO: 0 /100 WBCS (ref 0–0)
PLATELET # BLD AUTO: 205 X10*3/UL (ref 150–450)
POTASSIUM SERPL-SCNC: 4.2 MMOL/L (ref 3.5–5.3)
PROT SERPL-MCNC: 7.5 G/DL (ref 6.4–8.2)
RBC # BLD AUTO: 4.89 X10*6/UL (ref 4–5.2)
SODIUM SERPL-SCNC: 138 MMOL/L (ref 136–145)
TRIGL SERPL-MCNC: 41 MG/DL (ref 0–149)
TSH SERPL-ACNC: 1.02 MIU/L (ref 0.44–3.98)
VIT B12 SERPL-MCNC: 491 PG/ML (ref 211–911)
VLDL: 8 MG/DL (ref 0–40)
WBC # BLD AUTO: 3.3 X10*3/UL (ref 4.4–11.3)

## 2024-02-06 PROCEDURE — 85027 COMPLETE CBC AUTOMATED: CPT

## 2024-02-06 PROCEDURE — 80053 COMPREHEN METABOLIC PANEL: CPT

## 2024-02-06 PROCEDURE — 82306 VITAMIN D 25 HYDROXY: CPT

## 2024-02-06 PROCEDURE — 80061 LIPID PANEL: CPT

## 2024-02-06 PROCEDURE — 84443 ASSAY THYROID STIM HORMONE: CPT

## 2024-02-06 PROCEDURE — 82607 VITAMIN B-12: CPT

## 2024-02-06 PROCEDURE — 36415 COLL VENOUS BLD VENIPUNCTURE: CPT

## 2024-02-06 PROCEDURE — 83036 HEMOGLOBIN GLYCOSYLATED A1C: CPT

## 2024-03-04 NOTE — PROGRESS NOTES
"Subjective   Patient ID: Gwen Thompson is a 68 y.o. female who presents for Medicare Annual Wellness Visit Subsequent.      HPI   The patient states that she is taking rosuvastatin for hyperlipidemia and her cholesterol and her numbers are stable at this time. Her blood work shows that she is in the prediabetes range.     Review of Systems  Constitutional: No fever or chills, No Night Sweats  Eyes: No Blurry Vision or Eye sight problems  ENT: No Nasal Discharge, Hoarseness, sore throat  Cardiovascular: no chest pain, no palpitations and no syncope.   Respiratory: no cough, no shortness of breath during exertion and no shortness of breath at rest.   Gastrointestinal: no abdominal pain, no nausea and no vomiting.   : No vaginal discharge, burning with urination, no blood in urine or stools  Skin: No Skin rashes or Lesions  Neuro: No Headache, no dizziness or Numbness or tingling  Psych: No Anxiety, depression or sleeping problems  Heme: No Easy bleeding or brusing.     Objective   /74   Pulse 76   Ht 1.6 m (5' 3\")   Wt 74.4 kg (164 lb)   SpO2 98%   BMI 29.05 kg/m²     Physical Exam  Constitutional: Alert and in no acute distress. Well developed, well nourished.   Head and Face: Head and face: Normal.    Eyes: Normal external exam. Pupils were equal in size, round, reactive to light (PERRL) with normal accommodation and extraocular movements intact (EOMI).   Ears, Nose, Mouth, and Throat: External inspection of ears and nose: Normal.  Hearing: Normal.  Nasal mucosa, septum, and turbinates: Normal.  Lips, teeth, and gums: Normal.  Oropharynx: Normal.   Neck: No neck mass was observed. Supple. Thyroid not enlarged and there were no palpable thyroid nodules.   Cardiovascular: Heart rate and rhythm were normal, normal S1 and S2. Pedal pulses: Normal. No peripheral edema.   Pulmonary: No respiratory distress. Clear bilateral breath sounds.   Abdomen: Soft nontender; no abdominal mass palpated. Normal bowel " sounds. No organomegaly.   Musculoskeletal: No joint swelling seen, normal movements of all extremities. Range of motion: Normal.  Muscle strength/tone: Normal.    Skin: Normal skin color and pigmentation, normal skin turgor, and no rash.   Neurologic: Deep tendon reflexes were 2+ and symmetric.   Psychiatric: Judgment and insight: Intact. Mood and affect: Normal.  Lymphatic: No cervical lymphadenopathy. Palpation of lymph nodes in axillae: Normal.  Palpation of lymph nodes in groin: Normal.    Lab Results   Component Value Date    WBC 3.3 (L) 02/06/2024    HGB 13.8 02/06/2024    HCT 41.3 02/06/2024     02/06/2024    CHOL 165 02/06/2024    TRIG 41 02/06/2024    HDL 76.4 02/06/2024    LDLDIRECT 171 (H) 02/12/2020    ALT 15 02/06/2024    AST 19 02/06/2024     02/06/2024    K 4.2 02/06/2024     02/06/2024    CREATININE 0.81 02/06/2024    BUN 12 02/06/2024    CO2 27 02/06/2024    TSH 1.02 02/06/2024    INR 1.1 10/30/2019    HGBA1C 5.9 (H) 02/06/2024       US thyroid  Narrative: Interpreted By:  Terry Cruz,   STUDY:  US THYROID;  11/9/2023 10:46 am      INDICATION:  Signs/Symptoms: follow up on nodule.      COMPARISON:  None.      ACCESSION NUMBER(S):  [id number]      ORDERING CLINICIAN:  LEIDY BURNETTE      TECHNIQUE:  Multiple ultrasonographic images of the thyroid gland and surrounding  tissues were obtained.      FINDINGS:  PARENCHYMA:      SIZE:  RIGHT LOBE:  5 x 1.5 x 1.4 cm  LEFT LOBE:  6.2 x 2.4 x 2.7 cm  ISTHMUS:  1.5 cm      NODULES: (Please note, assessment and description of nodules is per  TI-RADS criteria. Up to 4 total nodules described, which includes  largest and/or most clinically significant based on morphology.) It  is noted that some spongiform and/or cystic nodules may not be  specifically described and are TR category 1 (benign).      NODULE #: 1.      Location: Thyroid isthmus  Size: 3.6 x 3.8 cm by 2 cm  Composition:  mixed cystic and solid (1)  Echogenicity:  Hyperechoic  or isoechoic (1)  Shape:  Wider-than-tall (0)  Margin:  Smooth (0)  Echogenic Foci:  None or Large comet-tail artifacts (0)      If present previously:  Significant change in size (>/= 20% diameter increase in at least  two dimensions and minimal increase of 2mm?): No significant change.  Change in features or ACR TI-RADS category: No change.      The total score of this nodule is 2 points, corresponding to a  TI-RADS category 2      NODULE #: 2.      Location: Left midpole  Size: 1.7 x 2.7 cm by 1.9 cm this is remeasured and found to be  similar Composition: mixed cystic and solid (1)  Echogenicity: Hyperechoic or isoechoic (1)  Shape:  Wider-than-tall (0)  Margin:  Smooth (0)  Echogenic Foci: None or Large comet-tail artifacts (0)      If present previously:  Significant change in size (>/= 20% diameter increase in at least  two dimensions and minimal increase of 2mm?): No significant change.  Change in features or ACR TI-RADS category: Yes, .      The total score of this nodule is 2 points, corresponding to a  TI-RADS category 2; (2 points) not suspicious.      Other smaller lymph nodes appear similar.  CERVICAL LYMPH NODES:  No enlarged or morphologically abnormal cervical nodes are seen.      Impression: 1. Stable enlarged multinodular goiter                  Please note that these statements are based on the recommendations of  the American College of Radiology      TI-RADS grading system. ACR TI-RADS recommendations:      TR5 (?7 points) highly suspicious - FNA if ? 1cm, follow-up if 0.5  -0.9 cm every year for 5 years. Aggregate cancer risk 35%. TR4 (4-6  points) moderately suspicious - FNA if ? 1.5cm, follow-up if 1 -1.4  cm in 1, 2, 3 and 5 years. Aggregate cancer risk 9.1% TR3 (3 points)  mildly suspicious - FNA if ? 2.5cm, follow-up if 1.5 -2.4 cm in 1, 3  and 5 years. Aggregate cancer risk 4.8% TR2 (2 points) not  suspicious. Aggregate cancer risk 1.5% TR1 (0 points) benign - No FNA  or follow-up.  Aggregate cancer risk 0.3%      Signed by: Terry Cruz 11/10/2023 5:47 PM  Dictation workstation:   [id number]      Assessment/Plan   Diagnoses and all orders for this visit:  Medicare annual wellness visit, initial  Welcome to Medicare preventive visit  -     Follow Up In [facility] - PCP  Other decreased white blood cell (WBC) count  -     Referral to Hematology and Oncology; Future  Pure hypercholesterolemia  -     rosuvastatin (Crestor) 5 mg tablet; Take 1 tablet (5 mg) by mouth once daily at bedtime.  -     CBC; Future  -     Comprehensive Metabolic Panel; Future  -     Lipid Panel; Future  -     Follow Up In [facility] - PCP - Medicare Annual; Future  Need for hepatitis C screening test  -     Hepatitis C antibody; Future  Prediabetes  -     Hemoglobin A1C; Future  Abnormal thyroid exam  -     TSH with reflex to Free T4 if abnormal; Future  Vitamin D deficiency  -     Vitamin B12; Future  -     Vitamin D 25-Hydroxy,Total (for eval of Vitamin D levels); Future        Dear Gwen Thompson     It was my pleasure to take care of you today in the office. Below are the things we discussed today:    1. 1. Immunizations: Yearly Flu shot is recommended.          a: COVID: Patient declined booster          b: Tetanus: Up-to-date. Last done in May 2023          c: Shingrix: Up-to-date         d: Prevnar: Up-to-date         E. RSV: Recommended that she get it from the pharmacy in fall     2. Blood Work: Reviewed   3. Seen your dentist twice a year  4. Yearly Eye exam is recommended    5. BMI: Overweight   6: Diet recommendations:   Eat Clean, Try to have as many home cooked meals as possible  Avoid processed foods which contain excess calories, sugar, and sodium.    7. Exercise recommendations:   150 minutes a week to maintain your weight     If you have to loose weight, you need a better diet and exercise plan.     8. Supplements recommended:  a - Calcium 600 mg up to twice a day to get a  total of 1200 mg. Each 8 oz of milk or yogurt or 1 oz of cheese, 1 Banana, 1 serving of green Leafy vegetable has about 300 mg of Calcium, so you may subtract that amount. Calcium citrate is the only acceptable supplement to take if you take an acid suppressing medication like Prilosec; otherwise Calcium carbonate is acceptable too (It can cause Constipation).   b - Vitamin D - 2000 IU daily     9. Please get your Living will / Advance directive completed if you do not have one already. Please make sure our office has a copy of the latest one.     10. Colonoscopy: Uptodate. Last done in Oct 2023, repeat in Oct 2028   11. Mammogram: Uptodate. The patient follows up at the clinic  12. PAP: Up-to-date. Last done in Feb 2022. Cytology and HPV negative   13. DEXA: Up-to-date. Done in April 2023   14: Skin Check: Please see Dermatology once a year for a Skin Check.     15. Hyperlipidemia: Continue rosuvastatin.     16. Prediabetes: A1c showed 5.9. Advised her to be mindful of carbohydrate and sugar intake. We will continue to monitor.    17. Neutropenia: We will continue to monitor. Hematology referral.    Follow up in one year for a Physical. Please call the office before your Physical to see if you need blood work completed prior to your physical.     Please call me if any questions arise from now until your next visit. I will call you after I am done seeing patients. A Doctor is always available by phone when the office is closed. Please feel free to call for help with any problem that you feel shouldn't wait until the office re-opens.     Scribe Attestation  By signing my name below, IMarily Scribe   attest that this documentation has been prepared under the direction and in the presence of Shayna Park MD.

## 2024-03-05 ENCOUNTER — OFFICE VISIT (OUTPATIENT)
Dept: PRIMARY CARE | Facility: CLINIC | Age: 69
End: 2024-03-05
Payer: MEDICARE

## 2024-03-05 VITALS
OXYGEN SATURATION: 98 % | HEART RATE: 76 BPM | DIASTOLIC BLOOD PRESSURE: 74 MMHG | WEIGHT: 164 LBS | SYSTOLIC BLOOD PRESSURE: 130 MMHG | BODY MASS INDEX: 29.06 KG/M2 | HEIGHT: 63 IN

## 2024-03-05 DIAGNOSIS — Z11.59 NEED FOR HEPATITIS C SCREENING TEST: ICD-10-CM

## 2024-03-05 DIAGNOSIS — R73.03 PREDIABETES: ICD-10-CM

## 2024-03-05 DIAGNOSIS — R94.6 ABNORMAL THYROID EXAM: ICD-10-CM

## 2024-03-05 DIAGNOSIS — D72.818 OTHER DECREASED WHITE BLOOD CELL (WBC) COUNT: ICD-10-CM

## 2024-03-05 DIAGNOSIS — E55.9 VITAMIN D DEFICIENCY: ICD-10-CM

## 2024-03-05 DIAGNOSIS — Z00.00 WELCOME TO MEDICARE PREVENTIVE VISIT: ICD-10-CM

## 2024-03-05 DIAGNOSIS — Z00.00 MEDICARE ANNUAL WELLNESS VISIT, INITIAL: Primary | ICD-10-CM

## 2024-03-05 DIAGNOSIS — E78.00 PURE HYPERCHOLESTEROLEMIA: ICD-10-CM

## 2024-03-05 PROCEDURE — 1160F RVW MEDS BY RX/DR IN RCRD: CPT | Performed by: FAMILY MEDICINE

## 2024-03-05 PROCEDURE — 1157F ADVNC CARE PLAN IN RCRD: CPT | Performed by: FAMILY MEDICINE

## 2024-03-05 PROCEDURE — 1170F FXNL STATUS ASSESSED: CPT | Performed by: FAMILY MEDICINE

## 2024-03-05 PROCEDURE — 1126F AMNT PAIN NOTED NONE PRSNT: CPT | Performed by: FAMILY MEDICINE

## 2024-03-05 PROCEDURE — 1036F TOBACCO NON-USER: CPT | Performed by: FAMILY MEDICINE

## 2024-03-05 PROCEDURE — 1123F ACP DISCUSS/DSCN MKR DOCD: CPT | Performed by: FAMILY MEDICINE

## 2024-03-05 PROCEDURE — 99397 PER PM REEVAL EST PAT 65+ YR: CPT | Performed by: FAMILY MEDICINE

## 2024-03-05 PROCEDURE — 99214 OFFICE O/P EST MOD 30 MIN: CPT | Performed by: FAMILY MEDICINE

## 2024-03-05 PROCEDURE — G0438 PPPS, INITIAL VISIT: HCPCS | Performed by: FAMILY MEDICINE

## 2024-03-05 PROCEDURE — 1159F MED LIST DOCD IN RCRD: CPT | Performed by: FAMILY MEDICINE

## 2024-03-05 RX ORDER — ROSUVASTATIN CALCIUM 5 MG/1
5 TABLET, COATED ORAL NIGHTLY
Qty: 90 TABLET | Refills: 3 | Status: SHIPPED | OUTPATIENT
Start: 2024-03-05 | End: 2025-03-05

## 2024-03-05 RX ORDER — CYANOCOBALAMIN (VITAMIN B-12) 250 MCG
250 TABLET ORAL DAILY
COMMUNITY

## 2024-03-05 RX ORDER — VITS A,C,E/LUTEIN/MINERALS 300MCG-200
200 TABLET ORAL
COMMUNITY

## 2024-03-05 ASSESSMENT — ACTIVITIES OF DAILY LIVING (ADL)
GROCERY_SHOPPING: INDEPENDENT
MANAGING_FINANCES: INDEPENDENT
BATHING: INDEPENDENT
DOING_HOUSEWORK: INDEPENDENT
DRESSING: INDEPENDENT
TAKING_MEDICATION: INDEPENDENT

## 2024-03-05 ASSESSMENT — COLUMBIA-SUICIDE SEVERITY RATING SCALE - C-SSRS
2. HAVE YOU ACTUALLY HAD ANY THOUGHTS OF KILLING YOURSELF?: NO
1. IN THE PAST MONTH, HAVE YOU WISHED YOU WERE DEAD OR WISHED YOU COULD GO TO SLEEP AND NOT WAKE UP?: NO

## 2024-03-05 ASSESSMENT — PATIENT HEALTH QUESTIONNAIRE - PHQ9
SUM OF ALL RESPONSES TO PHQ9 QUESTIONS 1 AND 2: 0
2. FEELING DOWN, DEPRESSED OR HOPELESS: NOT AT ALL
1. LITTLE INTEREST OR PLEASURE IN DOING THINGS: NOT AT ALL

## 2024-03-05 ASSESSMENT — ENCOUNTER SYMPTOMS
LOSS OF SENSATION IN FEET: 0
DEPRESSION: 0
OCCASIONAL FEELINGS OF UNSTEADINESS: 0

## 2024-06-07 ENCOUNTER — TELEPHONE (OUTPATIENT)
Dept: OBSTETRICS AND GYNECOLOGY | Facility: CLINIC | Age: 69
End: 2024-06-07
Payer: MEDICARE

## 2024-06-07 NOTE — TELEPHONE ENCOUNTER
Called patient and left a voicemail in regards to patient confirming appointment.    THONY CHO MA

## 2024-06-10 ASSESSMENT — ENCOUNTER SYMPTOMS
DYSURIA: 0
VOMITING: 0
CHILLS: 0
ABDOMINAL PAIN: 0
NAUSEA: 0
BACK PAIN: 0
FREQUENCY: 0
HEMATURIA: 0
FEVER: 0
ANOREXIA: 0
HEADACHES: 0
DIARRHEA: 0
SORE THROAT: 0
FLANK PAIN: 0
CONSTIPATION: 0

## 2024-06-11 ENCOUNTER — OFFICE VISIT (OUTPATIENT)
Dept: OBSTETRICS AND GYNECOLOGY | Facility: CLINIC | Age: 69
End: 2024-06-11
Payer: MEDICARE

## 2024-06-11 VITALS
WEIGHT: 161 LBS | BODY MASS INDEX: 27.49 KG/M2 | DIASTOLIC BLOOD PRESSURE: 66 MMHG | HEIGHT: 64 IN | SYSTOLIC BLOOD PRESSURE: 132 MMHG

## 2024-06-11 DIAGNOSIS — N95.2 ATROPHIC VAGINITIS: Primary | ICD-10-CM

## 2024-06-11 DIAGNOSIS — D21.9 FIBROID: ICD-10-CM

## 2024-06-11 PROCEDURE — 1157F ADVNC CARE PLAN IN RCRD: CPT | Performed by: OBSTETRICS & GYNECOLOGY

## 2024-06-11 PROCEDURE — 1159F MED LIST DOCD IN RCRD: CPT | Performed by: OBSTETRICS & GYNECOLOGY

## 2024-06-11 PROCEDURE — 99203 OFFICE O/P NEW LOW 30 MIN: CPT | Performed by: OBSTETRICS & GYNECOLOGY

## 2024-06-11 PROCEDURE — 1036F TOBACCO NON-USER: CPT | Performed by: OBSTETRICS & GYNECOLOGY

## 2024-06-11 PROCEDURE — 1160F RVW MEDS BY RX/DR IN RCRD: CPT | Performed by: OBSTETRICS & GYNECOLOGY

## 2024-06-11 PROCEDURE — 1123F ACP DISCUSS/DSCN MKR DOCD: CPT | Performed by: OBSTETRICS & GYNECOLOGY

## 2024-06-11 RX ORDER — ESTRADIOL 0.1 MG/G
CREAM VAGINAL
COMMUNITY

## 2024-06-11 NOTE — PROGRESS NOTES
"Answers submitted by the patient for this visit:  Female Genital Questionnaire (Submitted on 6/10/2024)  Chief Complaint: Female genitourinary complaint  pelvic pain: Yes  Chronicity: recurrent  Onset: more than 1 month ago  Frequency: intermittently  Progression since onset: rapidly improving  Severity of pain: mild  Affected side: both  Pregnant now?: No  abdominal pain: No  anorexia: No  back pain: No  chills: No  constipation: No  diarrhea: No  discolored urine: No  dysuria: No  fever: No  flank pain: No  frequency: No  headaches: No  hematuria: No  joint pain: No  joint swelling: No  nausea: No  painful intercourse: No  rash: No  sore throat: No  urgency: No  vomiting: No  Aggravated by: nothing  Sexual activity: not sexually active  Partner with STD symptoms: no  Birth control: nothing  Menstrual history: postmenopausal        Subjective   Patient ID: Gwen Thompson is a 68 y.o. female who presents for New Patient Visit (Pelvic floor ).    Presents today for vaginal dryness  Has atrophy  Was on vaginal DHEA-S and has stopped  Is now on estradiol  Has noticed a benefit  Is not in need of a refill      ROS  All Normal Review of Systems  Constitutional: no fever, no chills, no recent weight gain, no recent weight loss and no fatigue.    Gastrointestinal: no abdominal pain, no constipation, no nausea, no diarrhea and no vomiting.    Genitourinary: no dysuria, no urinary incontinence, no vaginal dryness, no vaginal itching, no dyspareunia, no pelvic pain, no dysmenorrhea, no sexual problems, no change in urinary frequency, no vaginal discharge, no unexplained vaginal bleeding and no lesion/sore.    Breasts: No masses.  No nipple discharge.  No redness    Objective   /66   Ht 1.626 m (5' 4\")   Wt 73 kg (161 lb)   BMI 27.64 kg/m²    Physical Exam  General: No distress.  Alert and oriented  Abdomen: Soft, nontender, nondistended  External Genitalia:  no masses.  no erythema.  no discharge noted.  Vagina:  no " masses.  no discharge noted.    Cervix: no masses.    Uterus:  enlarged fibroid uterus  Adnexa: R: no masses, non tender.    Adnexa: L: no masses.  non tender  Extremities: No swelling  Psych: No sadness.      Assessment/Plan   1) vaginal atrophy  Is on estradiol  Doing well    2) enlarged fibroid uterus approximately 20 weeks in size      Thank you for your visit to our office today.

## 2024-10-02 ENCOUNTER — APPOINTMENT (OUTPATIENT)
Dept: OBSTETRICS AND GYNECOLOGY | Facility: CLINIC | Age: 69
End: 2024-10-02
Payer: MEDICARE

## 2024-10-15 ENCOUNTER — HOSPITAL ENCOUNTER (OUTPATIENT)
Dept: RADIOLOGY | Facility: CLINIC | Age: 69
Discharge: HOME | End: 2024-10-15
Payer: MEDICARE

## 2024-10-15 VITALS — BODY MASS INDEX: 27.48 KG/M2 | WEIGHT: 160.94 LBS | HEIGHT: 64 IN

## 2024-10-15 DIAGNOSIS — Z12.31 SCREENING MAMMOGRAM FOR BREAST CANCER: ICD-10-CM

## 2024-10-15 PROCEDURE — 77067 SCR MAMMO BI INCL CAD: CPT

## 2024-10-19 ENCOUNTER — CLINICAL SUPPORT (OUTPATIENT)
Dept: PRIMARY CARE | Facility: CLINIC | Age: 69
End: 2024-10-19
Payer: MEDICARE

## 2024-10-19 DIAGNOSIS — Z23 NEED FOR INFLUENZA VACCINATION: ICD-10-CM

## 2024-10-19 PROCEDURE — 90662 IIV NO PRSV INCREASED AG IM: CPT | Performed by: INTERNAL MEDICINE

## 2024-10-19 PROCEDURE — G0008 ADMIN INFLUENZA VIRUS VAC: HCPCS | Performed by: INTERNAL MEDICINE

## 2024-10-24 ENCOUNTER — HOSPITAL ENCOUNTER (OUTPATIENT)
Dept: RADIOLOGY | Facility: EXTERNAL LOCATION | Age: 69
Discharge: HOME | End: 2024-10-24

## 2024-10-24 DIAGNOSIS — N64.89 BREAST ASYMMETRY: Primary | ICD-10-CM

## 2024-10-30 ENCOUNTER — HOSPITAL ENCOUNTER (OUTPATIENT)
Dept: RADIOLOGY | Facility: CLINIC | Age: 69
Discharge: HOME | End: 2024-10-30
Payer: MEDICARE

## 2024-10-30 DIAGNOSIS — N64.89 BREAST ASYMMETRY: ICD-10-CM

## 2024-10-30 PROCEDURE — G0279 TOMOSYNTHESIS, MAMMO: HCPCS | Mod: LEFT SIDE | Performed by: RADIOLOGY

## 2024-10-30 PROCEDURE — 77065 DX MAMMO INCL CAD UNI: CPT | Mod: LEFT SIDE | Performed by: RADIOLOGY

## 2024-10-30 PROCEDURE — 77061 BREAST TOMOSYNTHESIS UNI: CPT | Mod: LT

## 2025-02-12 LAB
25(OH)D3+25(OH)D2 SERPL-MCNC: 42 NG/ML (ref 30–100)
ALBUMIN SERPL-MCNC: 4.4 G/DL (ref 3.6–5.1)
ALP SERPL-CCNC: 73 U/L (ref 37–153)
ALT SERPL-CCNC: 12 U/L (ref 6–29)
ANION GAP SERPL CALCULATED.4IONS-SCNC: 9 MMOL/L (CALC) (ref 7–17)
AST SERPL-CCNC: 18 U/L (ref 10–35)
BILIRUB SERPL-MCNC: 0.5 MG/DL (ref 0.2–1.2)
BUN SERPL-MCNC: 15 MG/DL (ref 7–25)
CALCIUM SERPL-MCNC: 9.6 MG/DL (ref 8.6–10.4)
CHLORIDE SERPL-SCNC: 102 MMOL/L (ref 98–110)
CHOLEST SERPL-MCNC: 178 MG/DL
CHOLEST/HDLC SERPL: 2.4 (CALC)
CO2 SERPL-SCNC: 28 MMOL/L (ref 20–32)
CREAT SERPL-MCNC: 0.79 MG/DL (ref 0.5–1.05)
EGFRCR SERPLBLD CKD-EPI 2021: 82 ML/MIN/1.73M2
ERYTHROCYTE [DISTWIDTH] IN BLOOD BY AUTOMATED COUNT: 12.7 % (ref 11–15)
EST. AVERAGE GLUCOSE BLD GHB EST-MCNC: 120 MG/DL
EST. AVERAGE GLUCOSE BLD GHB EST-SCNC: 6.6 MMOL/L
GLUCOSE SERPL-MCNC: 98 MG/DL (ref 65–99)
HBA1C MFR BLD: 5.8 % OF TOTAL HGB
HCT VFR BLD AUTO: 40.7 % (ref 35–45)
HCV AB SERPL QL IA: NORMAL
HDLC SERPL-MCNC: 74 MG/DL
HGB BLD-MCNC: 13.6 G/DL (ref 11.7–15.5)
LDLC SERPL CALC-MCNC: 90 MG/DL (CALC)
MCH RBC QN AUTO: 29 PG (ref 27–33)
MCHC RBC AUTO-ENTMCNC: 33.4 G/DL (ref 32–36)
MCV RBC AUTO: 86.8 FL (ref 80–100)
NONHDLC SERPL-MCNC: 104 MG/DL (CALC)
PLATELET # BLD AUTO: 202 THOUSAND/UL (ref 140–400)
PMV BLD REES-ECKER: 11 FL (ref 7.5–12.5)
POTASSIUM SERPL-SCNC: 4.4 MMOL/L (ref 3.5–5.3)
PROT SERPL-MCNC: 7.3 G/DL (ref 6.1–8.1)
RBC # BLD AUTO: 4.69 MILLION/UL (ref 3.8–5.1)
SODIUM SERPL-SCNC: 139 MMOL/L (ref 135–146)
TRIGL SERPL-MCNC: 57 MG/DL
TSH SERPL-ACNC: 0.84 MIU/L (ref 0.4–4.5)
VIT B12 SERPL-MCNC: 453 PG/ML (ref 200–1100)
WBC # BLD AUTO: 3 THOUSAND/UL (ref 3.8–10.8)

## 2025-03-04 ENCOUNTER — APPOINTMENT (OUTPATIENT)
Dept: PRIMARY CARE | Facility: CLINIC | Age: 70
End: 2025-03-04
Payer: MEDICARE

## 2025-04-19 DIAGNOSIS — E78.00 PURE HYPERCHOLESTEROLEMIA: ICD-10-CM

## 2025-04-21 RX ORDER — ROSUVASTATIN CALCIUM 5 MG/1
5 TABLET, COATED ORAL NIGHTLY
Qty: 90 TABLET | Refills: 0 | Status: SHIPPED | OUTPATIENT
Start: 2025-04-21 | End: 2026-04-21

## 2025-05-30 ENCOUNTER — OFFICE VISIT (OUTPATIENT)
Dept: URGENT CARE | Age: 70
End: 2025-05-30
Payer: MEDICARE

## 2025-05-30 VITALS
DIASTOLIC BLOOD PRESSURE: 72 MMHG | SYSTOLIC BLOOD PRESSURE: 116 MMHG | HEIGHT: 64 IN | TEMPERATURE: 98 F | RESPIRATION RATE: 16 BRPM | HEART RATE: 101 BPM | BODY MASS INDEX: 27.62 KG/M2 | OXYGEN SATURATION: 95 %

## 2025-05-30 DIAGNOSIS — R39.89 URINARY PROBLEM: ICD-10-CM

## 2025-05-30 DIAGNOSIS — N30.01 ACUTE CYSTITIS WITH HEMATURIA: Primary | ICD-10-CM

## 2025-05-30 LAB
POC APPEARANCE, URINE: ABNORMAL
POC BILIRUBIN, URINE: NEGATIVE
POC BLOOD, URINE: ABNORMAL
POC COLOR, URINE: YELLOW
POC GLUCOSE, URINE: NEGATIVE MG/DL
POC KETONES, URINE: NEGATIVE MG/DL
POC LEUKOCYTES, URINE: ABNORMAL
POC NITRITE,URINE: NEGATIVE
POC PH, URINE: 6 PH
POC PROTEIN, URINE: ABNORMAL MG/DL
POC SPECIFIC GRAVITY, URINE: 1.02

## 2025-05-30 RX ORDER — GABAPENTIN 300 MG/1
CAPSULE ORAL
COMMUNITY
Start: 2025-05-12 | End: 2026-05-12

## 2025-05-30 RX ORDER — SULFAMETHOXAZOLE AND TRIMETHOPRIM 800; 160 MG/1; MG/1
1 TABLET ORAL 2 TIMES DAILY
Qty: 10 TABLET | Refills: 0 | Status: SHIPPED | OUTPATIENT
Start: 2025-05-30 | End: 2025-06-04

## 2025-05-30 NOTE — PROGRESS NOTES
"    History Of Present Illness:   Gwen Thompson      Patient presents to the [facility]:       Patient presents with dysuria since this morning.  She has had prior urinary tract infections.  No true abdominal pain.  Some nausea sensation but largely no nausea.  About an hour ago she noticed hematuria.  She presents today for further evaluation.    She also has left ear cerumen and she does have some over-the-counter treatments which we also discussed today but that is not her main presenting complaint today.      PMH:  - Dyslipidemia        Tobacco:  - None                           Review of Systems (BOLD if positive, delete if not asked):     Constitutional:   - fever   - chills   - night sweats  - unexpected weight change       Eyes:   - loss of vision  - double vision  - floaters     Ear/Nose/Throat/Mouth:   - hearing changes  - sore throat  - sinus congestion     Cardiovascular:   - chest pain  - chest heaviness  - palpitations  - swelling in ankles       Respiratory:   - shortness of breath  - difficulty breathing  - frequent cough  - wheezing    Musculoskeletal:   - bone pain  - muscle pain  - joint pain   -low back pain       Neurological:   - headache  - loss of consciousness  - tremors  - dizziness  - numbness   - tingling       Gastrointestinal:   - abdominal pain  - nausea  - vomiting  - constipation  - diarrhea  - bloody stools  - loss of bowel control  - heartburn       Genitourinary:   - increased urinary frequency  - painful urination  - blood in urine               Psychological:   - feeling generally happy  - feeling safe at home          Last Recorded Vitals:  Vitals:    05/30/25 1532   BP: 116/72   Pulse: 101   Resp: 16   Temp: 36.7 °C (98 °F)   TempSrc: Oral   SpO2: 95%   Height: 1.626 m (5' 4\")        Past Medical History:  She has no past medical history on file.     Past Surgical History:  She has a past surgical history that includes Myomectomy; Tonsillectomy; and " Breast biopsy.     Social History:  She reports that she has never smoked. She has never been exposed to tobacco smoke. She has never used smokeless tobacco. She reports current alcohol use of about 2.0 standard drinks of alcohol per week. She reports that she does not use drugs.     Family History:  Family History[1]  Allergies:  Patient has no known allergies.     Outpatient Medications:  Current Outpatient Medications   Medication Instructions    cholecalciferol (Vitamin D-3) 50 MCG (2000 UT) tablet Take by mouth.    cyanocobalamin (VITAMIN B-12) 250 mcg, Daily    estradiol (Estrace) 0.01 % (0.1 mg/gram) vaginal cream APPLY 1/2 GRAM AT BEDTIME 3 TIMES WEEKLY IN VAGINA, NEAR OPENING. CAN APPLY WITH FINGER    gabapentin (Neurontin) 300 mg capsule WEEK 1: 300 MG NIGHTLY. WEEK 2: 600 MG NIGHTLY. WEEK 3: 300 MG IN THE AM AND NOON, 600 MG NIGHTLY    magnesium oxide (MAG-OX) 200 mg, oral    rosuvastatin (CRESTOR) 5 mg, oral, Nightly                Physical Exam:  GENERAL: Well developed, well nourished, alert and cooperative, and appears to be in no acute distress.     PSYCH: mood pleasant and appropriate     HEAD: normocephalic     EYES: PERRL, EOMI. vision is grossly intact.     EARS: Hearing grossly intact.        Left tympanic membrane obscured by cerumen        LUNGS: Good respiratory effort.       ABD: soft  Nontender  No masses palpated.   No rebound tenderness or guarding  No CVA tenderness bilaterally       GAIT: Normal          Last Labs:  CBC -  Lab Results   Component Value Date    WBC 3.0 (L) 02/11/2025    HGB 13.6 02/11/2025    HCT 40.7 02/11/2025    MCV 86.8 02/11/2025     02/11/2025        CMP -  Lab Results   Component Value Date    CALCIUM 9.6 02/11/2025    PROT 7.3 02/11/2025    ALBUMIN 4.4 02/11/2025    AST 18 02/11/2025    ALT 12 02/11/2025    ALKPHOS 73 02/11/2025    BILITOT 0.5 02/11/2025        LIPID PANEL -  Lab Results   Component Value Date    CHOL 178 02/11/2025    TRIG 57 02/11/2025     HDL 74 02/11/2025    CHHDL 2.4 02/11/2025    LDLF 89 02/21/2023    VLDL 8 02/06/2024    NHDL 104 02/11/2025           Lab Results   Component Value Date    HGBA1C 5.8 (H) 02/11/2025               Urinalysis in the urgent care did show leukocyte esterase        Assessment/Plan   Problem List Items Addressed This Visit    None  Visit Diagnoses         Codes      Acute cystitis with hematuria    -  Primary N30.01      Urinary problem     R39.89    Relevant Orders    POCT UA Automated manually resulted (Completed)               Patient disposition: Home      Nice to meet you in the urgent care today.      Visit today for:   - Urinary tract action.  With positive analysis we agreed to start Bactrim antibiotics which we dispensed from the urgent care today.  Will send the urine off for a culture.    Also left argenis, discussed that over-the-counter therapies are helpful but let your primary physician know if these over-the-counter treatments do not work and see what they would like to do.      All questions were today by the end of the visit.        New medications today:  - Bactrim double strength 1 by mouth twice a day for 5 days    - Dispensed today at the [facility]                - No work note needed.        Follow up with your PCP if you are not improved in the next week.            Referrals and advanced imaging scheduling line: [phone].  Another scheduling number is: [phone].  Another potential phone number is: [phone] ([phone]).  The number for pediatric referrals is: [phone].            Recommend you get evaluated in the Emergency Department or call 9-1-1, if you experience chest pain, severe difficulty with breathing, loss of consciousness, major trauma, uncontrolled fever or blood pressure, sustained heart rate over 100, loss of vision, more than a teaspoon of bleeding from your GI tract, or signs of stroke.             Gurdeep Bennett,         [1]    Family History  Problem Relation Name Age of Onset    Diabetes Mother Ma     Acromegaly Mother Ma     Heart disease Mother Ma     Hypertension Father Dad     Coronary artery disease Father Dad         in native artery    Other (CVA) Father Dad     Stroke Father Dad     Vision loss Father Dad     Cancer Paternal Grandmother Gramps     Arthritis Sister Sis     Cancer Sister Sissy     Depression Brother Bro     Drug abuse Brother Brother     Kidney disease Sister Sister

## 2025-06-01 LAB — BACTERIA UR CULT: NORMAL

## 2025-06-09 ENCOUNTER — APPOINTMENT (OUTPATIENT)
Dept: PRIMARY CARE | Facility: CLINIC | Age: 70
End: 2025-06-09
Payer: MEDICARE

## 2025-06-09 VITALS
SYSTOLIC BLOOD PRESSURE: 112 MMHG | BODY MASS INDEX: 27.49 KG/M2 | WEIGHT: 161 LBS | DIASTOLIC BLOOD PRESSURE: 63 MMHG | HEIGHT: 64 IN | HEART RATE: 88 BPM | OXYGEN SATURATION: 96 %

## 2025-06-09 DIAGNOSIS — R31.21 ASYMPTOMATIC MICROSCOPIC HEMATURIA: ICD-10-CM

## 2025-06-09 DIAGNOSIS — H61.23 BILATERAL IMPACTED CERUMEN: Primary | ICD-10-CM

## 2025-06-09 PROCEDURE — 3008F BODY MASS INDEX DOCD: CPT | Performed by: FAMILY MEDICINE

## 2025-06-09 PROCEDURE — 1036F TOBACCO NON-USER: CPT | Performed by: FAMILY MEDICINE

## 2025-06-09 PROCEDURE — 69209 REMOVE IMPACTED EAR WAX UNI: CPT | Performed by: FAMILY MEDICINE

## 2025-06-09 ASSESSMENT — ENCOUNTER SYMPTOMS
DEPRESSION: 0
LOSS OF SENSATION IN FEET: 0
OCCASIONAL FEELINGS OF UNSTEADINESS: 0

## 2025-06-09 NOTE — PROGRESS NOTES
"Subjective   Patient ID: Gwen Thompson is a 69 y.o. female who presents for Ear Problem and Urinary Problem.    Patient ID: Gwen Thompson is a 69 y.o. female.    Ear Cerumen Removal    Date/Time: 6/9/2025 9:05 AM    Performed by: Shayna Park MD  Authorized by: Shayna Park MD    Consent:     Consent obtained:  Verbal    Consent given by:  Patient    Risks, benefits, and alternatives were discussed: yes    Universal protocol:     Procedure explained and questions answered to patient or proxy's satisfaction: yes      Patient identity confirmed:  Verbally with patient  Procedure details:     Location:  L ear and R ear    Procedure type: irrigation      Procedure outcomes: cerumen removed    Post-procedure details:     Inspection:  No bleeding    Procedure completion:  Tolerated well, no immediate complications      HPI     The patient states that she is taking rosuvastatin for hyperlipidemia and her cholesterol and her numbers are stable at this time. Her blood work shows that she is in the prediabetes range.     Today she presents with concerns over left ear blockage due to cerumen impaction. She has been using Debrox.     Also, she reports she had dysuria and hematuria on 5/30 and visited Urgent Care. She was placed on Bactrim.      Review of Systems  Constitutional: No fever or chills  Ear: +cerumen impaction in left ear  Cardiovascular: no chest pain, no palpitations and no syncope.   Respiratory: no cough, no shortness of breath during exertion and no shortness of breath at rest.   Gastrointestinal: no abdominal pain, no nausea and no vomiting.  : +urinary issues  Neuro: No Headache, no dizziness    Objective   /63   Pulse 88   Ht 1.626 m (5' 4\")   Wt 73 kg (161 lb)   SpO2 96%   BMI 27.64 kg/m²     Physical Exam  Constitutional: Alert and in no acute distress. Well developed, well nourished  Head and Face: Head and face: Normal.    Ear: Cerumen removed bilaterally without difficulty "   Musculoskeletal: Gait and station: Normal. Muscle strength/tone: Normal.   Skin: Normal skin color and pigmentation, normal skin turgor, and no rash.    Psychiatric: Judgment and insight: Intact. Mood and affect: Normal.      Lab Results   Component Value Date    WBC 3.0 (L) 02/11/2025    HGB 13.6 02/11/2025    HCT 40.7 02/11/2025     02/11/2025    CHOL 178 02/11/2025    TRIG 57 02/11/2025    HDL 74 02/11/2025    LDLDIRECT 171 (H) 02/12/2020    ALT 12 02/11/2025    AST 18 02/11/2025     02/11/2025    K 4.4 02/11/2025     02/11/2025    CREATININE 0.79 02/11/2025    BUN 15 02/11/2025    CO2 28 02/11/2025    TSH 0.84 02/11/2025    INR 1.1 10/30/2019    HGBA1C 5.8 (H) 02/11/2025       BI mammo left diagnostic tomosynthesis  Narrative: Interpreted By:  Liz Carlson,   STUDY:  BI MAMMO LEFT DIAGNOSTIC TOMOSYNTHESIS;  10/30/2024 11:41 am      ACCESSION NUMBER(S):  [id number]      ORDERING CLINICIAN:  LEIDY BURNETTE      INDICATION:  Asymmetry, inferior left breast on screening mammogram 10/15/2024.      ,N64.89 Other specified disorders of breast      COMPARISON:  Left mammogram 10/15/2024 with comparison 10/12/2023, 10/07/2022      FINDINGS:  MAMMOGRAPHY: 2D and tomosynthesis images were reviewed at 1 mm slice  thickness.      Density:  There are scattered areas of fibroglandular density.      On additional 2D and 3D imaging, the asymmetry in the inferior aspect  of the left breast is no longer seen. Multiple similar-appearing oval  circumscribed equal density masses are visualized scattered  throughout the left breast, stable dating back to 2022. No suspicious  masses or calcifications are identified.          Impression: Stable benign-appearing scattered left breast masses, unchanged  dating back to 2022. Routine annual screening recommended.      BI-RADS CATEGORY:  BI-RADS Category:  2 Benign.  Recommendation:  Annual Screening.  Recommended Date:  1 Year.  Laterality:  Bilateral.      For any  future breast imaging appointments, please call [phone] ([phone]).          MACRO:  None      Signed by: Liz Carlson 10/30/2024 12:09 PM  Dictation workstation:   [id number]      Assessment/Plan   Assessment & Plan  Bilateral impacted cerumen  Cerumen removed bilaterally in clinic without difficulty       Asymptomatic microscopic hematuria  UA ordered   Orders:    Urinalysis with Reflex Culture and Microscopic; Future              Your yearly Physical is due in: later in June 2025  When you call the office for your yearly Physical, please ask them to inform me to order your blood work, so that you can get the fasting blood work before your appointment and we can discuss the results at your physical.      Please call me if any questions arise from now until your next visit. I will call you after I am done seeing patients. A Doctor is always available by phone when the office is closed. Please feel free to call for help with any problem that you feel shouldn't wait until the office re-opens.     I, Shayna Park MD, attest that this note for 6/9/2025 accurately reflects documentation that my scribe Tyler Harvey, made at my direction in my capacity as Shayna Park MD when I treated Gwen Thompson.    Scribe Attestation  By signing my name below, ITyler Scribe   attest that this documentation has been prepared under the direction and in the presence of Shayna Park MD.

## 2025-06-16 ENCOUNTER — APPOINTMENT (OUTPATIENT)
Dept: OBSTETRICS AND GYNECOLOGY | Facility: CLINIC | Age: 70
End: 2025-06-16
Payer: MEDICARE

## 2025-06-19 LAB
APPEARANCE UR: CLEAR
BACTERIA #/AREA URNS HPF: ABNORMAL /HPF
BACTERIA UR CULT: ABNORMAL
BACTERIA UR CULT: ABNORMAL
BILIRUB UR QL STRIP: NEGATIVE
COLOR UR: YELLOW
GLUCOSE UR QL STRIP: NEGATIVE
HGB UR QL STRIP: NEGATIVE
HYALINE CASTS #/AREA URNS LPF: ABNORMAL /LPF
KETONES UR QL STRIP: NEGATIVE
LEUKOCYTE ESTERASE UR QL STRIP: ABNORMAL
NITRITE UR QL STRIP: NEGATIVE
PH UR STRIP: 5.5 [PH] (ref 5–8)
PROT UR QL STRIP: NEGATIVE
RBC #/AREA URNS HPF: ABNORMAL /HPF
SERVICE CMNT-IMP: ABNORMAL
SP GR UR STRIP: 1.01 (ref 1–1.03)
SQUAMOUS #/AREA URNS HPF: ABNORMAL /HPF
WBC #/AREA URNS HPF: ABNORMAL /HPF

## 2025-06-25 ENCOUNTER — APPOINTMENT (OUTPATIENT)
Dept: PRIMARY CARE | Facility: CLINIC | Age: 70
End: 2025-06-25
Payer: MEDICARE

## 2025-06-25 VITALS
SYSTOLIC BLOOD PRESSURE: 127 MMHG | OXYGEN SATURATION: 97 % | HEART RATE: 87 BPM | HEIGHT: 64 IN | DIASTOLIC BLOOD PRESSURE: 66 MMHG | BODY MASS INDEX: 27.31 KG/M2 | WEIGHT: 160 LBS

## 2025-06-25 DIAGNOSIS — R94.6 ABNORMAL THYROID EXAM: ICD-10-CM

## 2025-06-25 DIAGNOSIS — R73.03 PREDIABETES: ICD-10-CM

## 2025-06-25 DIAGNOSIS — Z00.00 MEDICARE ANNUAL WELLNESS VISIT, SUBSEQUENT: Primary | ICD-10-CM

## 2025-06-25 DIAGNOSIS — E78.00 PURE HYPERCHOLESTEROLEMIA: ICD-10-CM

## 2025-06-25 DIAGNOSIS — Z78.0 ASYMPTOMATIC MENOPAUSAL STATE: ICD-10-CM

## 2025-06-25 DIAGNOSIS — D47.2 MGUS (MONOCLONAL GAMMOPATHY OF UNKNOWN SIGNIFICANCE): ICD-10-CM

## 2025-06-25 DIAGNOSIS — E53.8 VITAMIN B12 DEFICIENCY: ICD-10-CM

## 2025-06-25 DIAGNOSIS — E55.9 VITAMIN D DEFICIENCY: ICD-10-CM

## 2025-06-25 DIAGNOSIS — Z12.31 ENCOUNTER FOR SCREENING MAMMOGRAM FOR BREAST CANCER: ICD-10-CM

## 2025-06-25 PROBLEM — R23.3 SPONTANEOUS BRUISING: Status: RESOLVED | Noted: 2023-01-20 | Resolved: 2025-06-25

## 2025-06-25 PROBLEM — M79.89 SWELLING OF FINGER, LEFT: Status: RESOLVED | Noted: 2023-01-20 | Resolved: 2025-06-25

## 2025-06-25 PROBLEM — E04.1 THYROID NODULE, COLD: Status: RESOLVED | Noted: 2023-01-20 | Resolved: 2025-06-25

## 2025-06-25 PROBLEM — R22.31 MASS OF RIGHT UPPER EXTREMITY: Status: RESOLVED | Noted: 2023-01-20 | Resolved: 2025-06-25

## 2025-06-25 PROCEDURE — 99397 PER PM REEVAL EST PAT 65+ YR: CPT | Performed by: FAMILY MEDICINE

## 2025-06-25 PROCEDURE — 1036F TOBACCO NON-USER: CPT | Performed by: FAMILY MEDICINE

## 2025-06-25 PROCEDURE — 1170F FXNL STATUS ASSESSED: CPT | Performed by: FAMILY MEDICINE

## 2025-06-25 PROCEDURE — 3008F BODY MASS INDEX DOCD: CPT | Performed by: FAMILY MEDICINE

## 2025-06-25 PROCEDURE — 99214 OFFICE O/P EST MOD 30 MIN: CPT | Performed by: FAMILY MEDICINE

## 2025-06-25 PROCEDURE — 1158F ADVNC CARE PLAN TLK DOCD: CPT | Performed by: FAMILY MEDICINE

## 2025-06-25 PROCEDURE — G0439 PPPS, SUBSEQ VISIT: HCPCS | Performed by: FAMILY MEDICINE

## 2025-06-25 PROCEDURE — 1160F RVW MEDS BY RX/DR IN RCRD: CPT | Performed by: FAMILY MEDICINE

## 2025-06-25 PROCEDURE — 1159F MED LIST DOCD IN RCRD: CPT | Performed by: FAMILY MEDICINE

## 2025-06-25 RX ORDER — ROSUVASTATIN CALCIUM 5 MG/1
5 TABLET, COATED ORAL NIGHTLY
Qty: 90 TABLET | Refills: 3 | Status: SHIPPED | OUTPATIENT
Start: 2025-06-25

## 2025-06-25 ASSESSMENT — ACTIVITIES OF DAILY LIVING (ADL)
BATHING: INDEPENDENT
MANAGING_FINANCES: INDEPENDENT
DOING_HOUSEWORK: INDEPENDENT
GROCERY_SHOPPING: INDEPENDENT
DRESSING: INDEPENDENT
TAKING_MEDICATION: INDEPENDENT

## 2025-06-25 ASSESSMENT — ENCOUNTER SYMPTOMS
DEPRESSION: 0
LOSS OF SENSATION IN FEET: 0
OCCASIONAL FEELINGS OF UNSTEADINESS: 0

## 2025-06-25 ASSESSMENT — PROMIS GLOBAL HEALTH SCALE
RATE_MENTAL_HEALTH: FAIR
RATE_SOCIAL_SATISFACTION: FAIR
RATE_AVERAGE_PAIN: 3
RATE_PHYSICAL_HEALTH: FAIR
RATE_AVERAGE_FATIGUE: MILD
CARRYOUT_SOCIAL_ACTIVITIES: GOOD
RATE_QUALITY_OF_LIFE: GOOD
CARRYOUT_PHYSICAL_ACTIVITIES: MOSTLY
EMOTIONAL_PROBLEMS: RARELY
RATE_GENERAL_HEALTH: GOOD

## 2025-06-25 NOTE — PROGRESS NOTES
"Subjective   Patient ID: Gwen Thompson is a 69 y.o. female who presents for Medicare Annual Wellness Visit Subsequent.    Patient Care Team:  Shayna Park MD as PCP - General    HPI     The patient states that she is taking rosuvastatin for hyperlipidemia and her cholesterol numbers are stable at this time. Additionally she takes vit D, B12, vaginal estradiol cream, baclofen cream and magnesium.  She is taking these medications as prescribed, tolerating them well without any side effects. She lives by herself and is managing well.      Blood pressure is stable at this time.  Denies any falls in the last year. No depression over the last few weeks. She eats a well-balanced diet, tries to drink enough fluids, reducing caffeine and exercises regularly. Denies hearing issues. She manages her finances and groceries by herself. She is doing well without any complaints or concerns.     Labs reviewed with patient and they were normal.  A1c was 5.8, stable. WBC shows neutropenia, follows up with Hematology.     Review of Systems  Constitutional: No fever or chills, No Night Sweats  Eyes: No Blurry Vision or Eye sight problems  ENT: No Nasal Discharge, Hoarseness, sore throat  Cardiovascular: no chest pain, no palpitations and no syncope.   Respiratory: no cough, no shortness of breath during exertion and no shortness of breath at rest.   Gastrointestinal: no abdominal pain, no nausea and no vomiting.   : No vaginal discharge, burning with urination, no blood in urine or stools  Skin: No Skin rashes or Lesions  Neuro: No Headache, no dizziness or Numbness or tingling  Psych: No Anxiety, depression or sleeping problems  Heme: No Easy bleeding or brusing.     Objective   /66   Pulse 87   Ht 1.626 m (5' 4\")   Wt 72.6 kg (160 lb)   SpO2 97%   BMI 27.46 kg/m²     Physical Exam  Constitutional: Alert and in no acute distress. Well developed, well nourished.   Head and Face: Head and face: Normal.    Eyes: Normal " external exam. Pupils were equal in size, round, reactive to light (PERRL) with normal accommodation and extraocular movements intact (EOMI).   Ears, Nose, Mouth, and Throat: External inspection of ears and nose: Normal.  Hearing: Normal.  Nasal mucosa, septum, and turbinates: Normal.  Lips, teeth, and gums: Normal.  Oropharynx: Normal.   Neck: No neck mass was observed. Supple. Thyroid not enlarged and there were no palpable thyroid nodules.   Cardiovascular: Heart rate and rhythm were normal, normal S1 and S2. Pedal pulses: Normal. No peripheral edema.   Pulmonary: No respiratory distress. Clear bilateral breath sounds.   Abdomen: Soft nontender; no abdominal mass palpated. Normal bowel sounds. No organomegaly.   : Deferred   Musculoskeletal: No joint swelling seen, normal movements of all extremities. Range of motion: Normal.  Muscle strength/tone: Normal.    Skin: Normal skin color and pigmentation, normal skin turgor, and no rash.   Neurologic: Deep tendon reflexes were 2+ and symmetric.   Psychiatric: Judgment and insight: Intact. Mood and affect: Normal.  Lymphatic: No cervical lymphadenopathy. Palpation of lymph nodes in axillae: Normal.  Palpation of lymph nodes in groin: Normal.    Lab Results   Component Value Date    WBC 3.0 (L) 02/11/2025    HGB 13.6 02/11/2025    HCT 40.7 02/11/2025     02/11/2025    CHOL 178 02/11/2025    TRIG 57 02/11/2025    HDL 74 02/11/2025    LDLDIRECT 171 (H) 02/12/2020    ALT 12 02/11/2025    AST 18 02/11/2025     02/11/2025    K 4.4 02/11/2025     02/11/2025    CREATININE 0.79 02/11/2025    BUN 15 02/11/2025    CO2 28 02/11/2025    TSH 0.84 02/11/2025    INR 1.1 10/30/2019    HGBA1C 5.8 (H) 02/11/2025       BI mammo left diagnostic tomosynthesis  Narrative: Interpreted By:  Liz Carlson,   STUDY:  BI MAMMO LEFT DIAGNOSTIC TOMOSYNTHESIS;  10/30/2024 11:41 am      ACCESSION NUMBER(S):  [id number]      ORDERING CLINICIAN:  LEIDY BURNETTE       INDICATION:  Asymmetry, inferior left breast on screening mammogram 10/15/2024.      ,N64.89 Other specified disorders of breast      COMPARISON:  Left mammogram 10/15/2024 with comparison 10/12/2023, 10/07/2022      FINDINGS:  MAMMOGRAPHY: 2D and tomosynthesis images were reviewed at 1 mm slice  thickness.      Density:  There are scattered areas of fibroglandular density.      On additional 2D and 3D imaging, the asymmetry in the inferior aspect  of the left breast is no longer seen. Multiple similar-appearing oval  circumscribed equal density masses are visualized scattered  throughout the left breast, stable dating back to 2022. No suspicious  masses or calcifications are identified.          Impression: Stable benign-appearing scattered left breast masses, unchanged  dating back to 2022. Routine annual screening recommended.      BI-RADS CATEGORY:  BI-RADS Category:  2 Benign.  Recommendation:  Annual Screening.  Recommended Date:  1 Year.  Laterality:  Bilateral.      For any future breast imaging appointments, please call [phone]  ([phone]).          MACRO:  None      Signed by: Liz Carlson 10/30/2024 12:09 PM  Dictation workstation:   [id number]      Assessment/Plan   Diagnoses and all orders for this visit:  Medicare annual wellness visit, subsequent  -     1 Year Follow Up In [facility] - PCP - Wellness Exam; Future  Pure hypercholesterolemia  -     Follow Up In [facility] - PCP - Medicare Annual  -     rosuvastatin (Crestor) 5 mg tablet; Take 1 tablet (5 mg) by mouth once daily at bedtime.  -     Comprehensive Metabolic Panel; Future  -     Lipid Panel; Future  Prediabetes  -     Hemoglobin A1C; Future  MGUS (monoclonal gammopathy of unknown significance)  -     CBC; Future  Asymptomatic menopausal state  -     XR DEXA bone density; Future  Encounter for screening mammogram for breast cancer  -     BI mammo bilateral screening tomosynthesis; Future  Abnormal thyroid exam  -      TSH with reflex to Free T4 if abnormal; Future  Vitamin D deficiency  -     Vitamin D 25-Hydroxy,Total (for eval of Vitamin D levels); Future  Vitamin B12 deficiency  -     Vitamin B12; Future        Dear Gwen Thompson     It was my pleasure to take care of you today in the office. Below are the things we discussed today:    1. 1. Immunizations: Yearly Flu shot is recommended. Up-to-date         a: COVID: Up-to-date booster          b: Tetanus: Up-to-date. Last done in May 2023          c: Shingrix: Up-to-date         d: Prevnar: Up-to-date         E. RSV: Recommended that she get it from the pharmacy in fall      2. Blood Work: Reviewed   3. Seen your dentist twice a year  4. Yearly Eye exam is recommended     5. BMI: Overweight   6: Diet recommendations:   Eat Clean, Try to have as many home cooked meals as possible  Avoid processed foods which contain excess calories, sugar, and sodium.     7. Exercise recommendations:   150 minutes a week to maintain your weight      If you have to lose weight, you need a better diet and exercise plan.      8. Supplements recommended:  a - Calcium 600 mg up to twice a day to get a total of 1200 mg. Each 8 oz of milk or yogurt or 1 oz of cheese, 1 Banana, 1 serving of green Leafy vegetable has about 300 mg of Calcium, so you may subtract that amount. Calcium citrate is the only acceptable supplement to take if you take an acid suppressing medication like Prilosec; otherwise Calcium carbonate is acceptable too (It can cause Constipation).   b - Vitamin D - 2000 IU daily      9. Please get your Living will / Advance directive completed if you do not have one already. Please make sure our office has a copy of the latest one.      10. Colonoscopy: Uptodate. Last done in Oct 2023, repeat in Oct 2028   11. Mammogram: Uptodate. Done Oct 2024. Ordered for Oct 2025  12. PAP: Up-to-date. Last done in Feb 2022. Cytology and HPV negative   13. DEXA: Up-to-date. Done in April 2023. Ordered  for 2025   14: Skin Check: Please see Dermatology once a year for a Skin Check.      15. Hyperlipidemia: Stable.  Continue rosuvastatin.      16. Prediabetes: A1c was 5.8. Advised her to be mindful of carbohydrate and sugar intake. We will continue to monitor.     17. MGUS:  She follows up with Hematology.       Follow up in one year for a Physical. Please call the office before your Physical to see if you need blood work completed prior to your physical.     Please call me if any questions arise from now until your next visit. I will call you after I am done seeing patients. A Doctor is always available by phone when the office is closed. Please feel free to call for help with any problem that you feel shouldn't wait until the office re-opens.     I, Shayna Park MD, attest that this note for 6/25/2025 accurately reflects documentation that my scribe Tyler Harvey, made at my direction in my capacity as Shayna Park MD when I treated Gwen Thompson.    Scribe Attestation  By signing my name below, ITyler Scribe   attest that this documentation has been prepared under the direction and in the presence of Shayna Park MD.

## 2025-07-29 ENCOUNTER — APPOINTMENT (OUTPATIENT)
Dept: URGENT CARE | Age: 70
End: 2025-07-29
Payer: MEDICARE

## 2025-10-16 ENCOUNTER — APPOINTMENT (OUTPATIENT)
Dept: RADIOLOGY | Facility: CLINIC | Age: 70
End: 2025-10-16
Payer: MEDICARE